# Patient Record
Sex: MALE | Race: OTHER
[De-identification: names, ages, dates, MRNs, and addresses within clinical notes are randomized per-mention and may not be internally consistent; named-entity substitution may affect disease eponyms.]

---

## 2020-03-05 ENCOUNTER — HOSPITAL ENCOUNTER (EMERGENCY)
Dept: HOSPITAL 8 - ED | Age: 70
Discharge: HOME | End: 2020-03-05
Payer: COMMERCIAL

## 2020-03-05 VITALS — HEIGHT: 66 IN | BODY MASS INDEX: 39.26 KG/M2 | WEIGHT: 244.27 LBS

## 2020-03-05 VITALS — SYSTOLIC BLOOD PRESSURE: 134 MMHG | DIASTOLIC BLOOD PRESSURE: 74 MMHG
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
## 2020-03-05 DIAGNOSIS — E11.9: ICD-10-CM

## 2020-03-05 DIAGNOSIS — M54.12: ICD-10-CM

## 2020-03-05 DIAGNOSIS — J02.9: ICD-10-CM
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
## 2020-03-05 DIAGNOSIS — S16.1XXA: Primary | ICD-10-CM
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
## 2020-03-05 LAB
ANION GAP SERPL CALC-SCNC: 7 MMOL/L (ref 5–15)
BASOPHILS # BLD AUTO: 0.03 X10^3/UL (ref 0–0.1)
BASOPHILS NFR BLD AUTO: 0 % (ref 0–1)
CALCIUM SERPL-MCNC: 8.4 MG/DL (ref 8.5–10.1)
CHLORIDE SERPL-SCNC: 108 MMOL/L (ref 98–107)
CREAT SERPL-MCNC: 0.78 MG/DL (ref 0.7–1.3)
EOSINOPHIL # BLD AUTO: 0.09 X10^3/UL (ref 0–0.4)
EOSINOPHIL NFR BLD AUTO: 1 % (ref 1–7)
ERYTHROCYTE [DISTWIDTH] IN BLOOD BY AUTOMATED COUNT: 14.4 % (ref 9.4–14.8)
LYMPHOCYTES # BLD AUTO: 2.11 X10^3/UL (ref 1–3.4)
LYMPHOCYTES NFR BLD AUTO: 30 % (ref 22–44)
MCH RBC QN AUTO: 32.2 PG (ref 27.5–34.5)
MCHC RBC AUTO-ENTMCNC: 33.7 G/DL (ref 33.2–36.2)
MCV RBC AUTO: 95.6 FL (ref 81–97)
MD: NO
MONOCYTES # BLD AUTO: 0.5 X10^3/UL (ref 0.2–0.8)
MONOCYTES NFR BLD AUTO: 7 % (ref 2–9)
NEUTROPHILS # BLD AUTO: 4.28 X10^3/UL (ref 1.8–6.8)
NEUTROPHILS NFR BLD AUTO: 61 % (ref 42–75)
PLATELET # BLD AUTO: 254 X10^3/UL (ref 130–400)
PMV BLD AUTO: 8 FL (ref 7.4–10.4)
RBC # BLD AUTO: 4.27 X10^6/UL (ref 4.38–5.82)

## 2020-03-05 PROCEDURE — 70491 CT SOFT TISSUE NECK W/DYE: CPT

## 2020-03-05 PROCEDURE — 87880 STREP A ASSAY W/OPTIC: CPT

## 2020-03-05 PROCEDURE — 85025 COMPLETE CBC W/AUTO DIFF WBC: CPT

## 2020-03-05 PROCEDURE — 80048 BASIC METABOLIC PNL TOTAL CA: CPT

## 2020-03-05 PROCEDURE — 36415 COLL VENOUS BLD VENIPUNCTURE: CPT

## 2020-03-05 PROCEDURE — 87081 CULTURE SCREEN ONLY: CPT

## 2020-03-05 PROCEDURE — 99285 EMERGENCY DEPT VISIT HI MDM: CPT

## 2020-03-05 NOTE — NUR
PT CAME IN CO OF "SCRATCHY SORE THROAT". DENIES FEVER, CHILLS. DENIES HAVING 
TROUBLE SWALLOING. STARTED 2 WEEKS AGO

## 2022-01-24 ENCOUNTER — OCCUPATIONAL MEDICINE (OUTPATIENT)
Dept: URGENT CARE | Facility: CLINIC | Age: 72
End: 2022-01-24
Payer: COMMERCIAL

## 2022-01-24 ENCOUNTER — APPOINTMENT (OUTPATIENT)
Dept: RADIOLOGY | Facility: IMAGING CENTER | Age: 72
End: 2022-01-24
Attending: FAMILY MEDICINE
Payer: COMMERCIAL

## 2022-01-24 VITALS
TEMPERATURE: 97.8 F | SYSTOLIC BLOOD PRESSURE: 142 MMHG | HEART RATE: 83 BPM | WEIGHT: 249.4 LBS | DIASTOLIC BLOOD PRESSURE: 82 MMHG | BODY MASS INDEX: 39.15 KG/M2 | HEIGHT: 67 IN | RESPIRATION RATE: 24 BRPM | OXYGEN SATURATION: 92 %

## 2022-01-24 DIAGNOSIS — S89.92XA INJURY OF LEFT SHIN, INITIAL ENCOUNTER: ICD-10-CM

## 2022-01-24 DIAGNOSIS — S89.91XA INJURY OF RIGHT SHIN, INITIAL ENCOUNTER: ICD-10-CM

## 2022-01-24 DIAGNOSIS — M79.662 PAIN IN LEFT SHIN: ICD-10-CM

## 2022-01-24 LAB
AMP AMPHETAMINE: NORMAL
BREATH ALCOHOL COMMENT: NORMAL
COC COCAINE: NORMAL
INT CON NEG: NEGATIVE
INT CON POS: POSITIVE
MET METHAMPHETAMINES: NORMAL
OPI OPIATES: NORMAL
PCP PHENCYCLIDINE: NORMAL
POC BREATHALIZER: 0 PERCENT (ref 0–0.01)
POC DRUG COMMENT 753798-OCCUPATIONAL HEALTH: NEGATIVE
THC: NORMAL

## 2022-01-24 PROCEDURE — 80305 DRUG TEST PRSMV DIR OPT OBS: CPT | Performed by: FAMILY MEDICINE

## 2022-01-24 PROCEDURE — 99203 OFFICE O/P NEW LOW 30 MIN: CPT | Performed by: FAMILY MEDICINE

## 2022-01-24 PROCEDURE — 82075 ASSAY OF BREATH ETHANOL: CPT | Performed by: FAMILY MEDICINE

## 2022-01-24 PROCEDURE — 73590 X-RAY EXAM OF LOWER LEG: CPT | Mod: TC,LT | Performed by: FAMILY MEDICINE

## 2022-01-24 RX ORDER — GABAPENTIN 100 MG/1
100 CAPSULE ORAL 3 TIMES DAILY
COMMUNITY

## 2022-01-24 ASSESSMENT — ENCOUNTER SYMPTOMS: FEVER: 0

## 2022-01-24 NOTE — LETTER
"EMPLOYEE’S CLAIM FOR COMPENSATION/ REPORT OF INITIAL TREATMENT  FORM C-4    EMPLOYEE’S CLAIM - PROVIDE ALL INFORMATION REQUESTED   First Name  Tom Last Name  Godfrey Birthdate                    1950                Sex  male Claim Number (Insurer’s Use Only)    Home Address  [address] Age  71 y.o. Height  1.69 m (5' 6.54\") Weight  113 kg (249 lb 6.4 oz) [facility]     [facility] Zip  [address] Telephone  [phone] (home)    Mailing Address  [address] [facility] Zip  [address] Primary Language Spoken  Croatian    Insurer   Third-Party   [employer]   Employee's Occupation (Job Title) When Injury or Occupational Disease Occurred      Employer's Name/Company Name  [employer]  Telephone  [phone]    Office Mail Address (Number and Street)   [address]  [facility]  Zip  [address]    Date of Injury  1/19/2022               Hours Injury  4:30 PM Date Employer Notified  1/19/2022 Last Day of Work after Injury     or Occupational Disease  1/22/2022 Supervisor to Whom Injury     Reported  Sr Ivonne   Address or Location of Accident (if applicable)  [[employer]]   What were you doing at the time of accident? (if applicable)  [employer]    How did this injury or occupational disease occur? (Be specific an answer in detail. Use additional sheet if necessary)  Moviendo cammie raca de vatas  call nieca sov de jassi   If you believe that you have an occupational disease, when did you first have knowledge of the disability and it relationship to your employment?  na Witnesses to the Accident  Ivonne      Nature of Injury or Occupational Disease  Sprain  Part(s) of Body Injured or Affected  Lower Leg (L), Lower Arm (R),     I certify that the above is true and correct to the best of my knowledge and that I have provided this information in order to obtain the benefits of [place]’s " [place]  I hereby authorize any physician, chiropractor, surgeon, practitioner, or other person, any hospital, including [facility], any medical service organization, any insurance company, or other institution or organization to release to each other, any medical or other information, including benefits paid or payable, pertinent to this injury or disease, except information relative to diagnosis, treatment and/or counseling for AIDS, psychological conditions, alcohol or controlled substances, for which I must give specific authorization.  A Photostat of this authorization shall be as valid as the original.     Date   Place Employee’s Original or  *Electronic Signature   THIS REPORT MUST BE COMPLETED AND MAILED WITHIN 3 WORKING DAYS OF TREATMENT   Place  [facility]  Name of [facility]   Date  1/24/2022 Diagnosis and Description of Injury or Occupational Disease  (M79.662) Pain in left shin  (S89.92XA) Injury of left shin, initial encounter  (S89.91XA) Injury of right shin, initial encounter Is there evidence the injured employee was under the influence of alcohol and/or another controlled substance at the time of accident?  ? No ? Yes (if yes, please explain)    Hour  2:45 PM   Diagnoses of Pain in left shin, Injury of left shin, initial encounter, and Injury of right shin, initial encounter were pertinent to this visit. No   Treatment  NSAIDs, heat, activity modification  Released to full duty   Have you advised the patient to remain off work five days or     more?    X-Ray Findings  Negative   ? Yes Indicate dates:   From   To      From information given by the employee, together with medical evidence, can        you directly connect this injury or occupational disease as job incurred?  Yes ? No If no, is the injured employee capable of:  ? full duty  Yes ?  "modified duty      Is additional medical care by a physician indicated?  No If Modified Duty, Specify any Limitations / Restrictions      Do you know of any previous injury or disease contributing to this condition or occupational disease?  ? Yes ? No (Explain if yes)                          No   Date  1/24/2022 Print Health Care Provider's   Raz Delgado M.D. I certify the employer’s copy of  this form was mailed on:   Address  [address] [place] [address] [address] Insurer’s Use Only     [facility] Zip  [address]    Provider’s Tax ID Number  [id number] Telephone  Dept: [phone]             Health Care Provider’s Original or Electronic Signature  e-RAZ Mack M.D. Degree (MD,DO, DC,PA-C,APRN)   MD      * Complete and attach Release of Information (Form C-4A) when injured employee signs C-4 Form electronically  ORIGINAL - TREATING HEALTHCARE PROVIDER PAGE 2 - INSURER/TPA PAGE 3 - EMPLOYER PAGE 4 - EMPLOYEE             Form C-4 (rev.08/21)           BRIEF DESCRIPTION OF RIGHTS AND BENEFITS  (Pursuant to [place] 616[place])    Notice of Injury or Occupational Disease (Incident Report Form C-1): If an injury or occupational disease (OD) arises out of and in the course of employment, you must provide written notice to your employer as soon as practicable, but no later than 7 days after the accident or OD. Your employer shall maintain a sufficient supply of the required forms.    Claim for Compensation (Form C-4): If medical treatment is sought, the form C-4 is available at the place of initial treatment. A completed \"Claim for Compensation\" (Form C-4) must be filed within 90 days after an accident or OD. The treating physician or chiropractor must, within 3 working days after treatment, complete and mail to the employer, the employer's insurer and third-party , the Claim for Compensation.    Medical Treatment: If you require medical treatment for your on-the-job injury or OD, you may be " required to select a physician or chiropractor from a list provided by your workers’ compensation insurer, if it has contracted with an Organization for Managed Care (MCO) or Preferred Provider Organization (PPO) or providers of health care. If your employer has not entered into a contract with an MCO or PPO, you may select a physician or chiropractor from the Panel of Physicians and Chiropractors. Any medical costs related to your industrial injury or OD will be paid by your insurer.    Temporary Total Disability (TTD): If your doctor has certified that you are unable to work for a period of at least 5 consecutive days, or 5 cumulative days in a 20-day period, or places restrictions on you that your employer does not accommodate, you may be entitled to TTD compensation.    Temporary Partial Disability (TPD): If the wage you receive upon reemployment is less than the compensation for TTD to which you are entitled, the insurer may be required to pay you TPD compensation to make up the difference. TPD can only be paid for a maximum of 24 months.    Permanent Partial Disability (PPD): When your medical condition is stable and there is an indication of a PPD as a result of your injury or OD, within 30 days, your insurer must arrange for an evaluation by a rating physician or chiropractor to determine the degree of your PPD. The amount of your PPD award depends on the date of injury, the results of the PPD evaluation, your age and wage.    Permanent Total Disability (PTD): If you are medically certified by a treating physician or chiropractor as permanently and totally disabled and have been granted a PTD status by your insurer, you are entitled to receive monthly benefits not to exceed 66 2/3% of your average monthly wage. The amount of your PTD payments is subject to reduction if you previously received a lump-sum PPD award.    Vocational Rehabilitation Services: You may be eligible for vocational rehabilitation  services if you are unable to return to the job due to a permanent physical impairment or permanent restrictions as a result of your injury or occupational disease.    Transportation and Per William Reimbursement: You may be eligible for travel expenses and per william associated with medical treatment.    Reopening: You may be able to reopen your claim if your condition worsens after claim closure.     Appeal Process: If you disagree with a written determination issued by the insurer or the insurer does not respond to your request, you may appeal to the Department of Administration, , by following the instructions contained in your determination letter. You must appeal the determination within 70 days from the date of the determination letter at [address], [address], [place], [place] [address], or [address], [place], [place] [address]. If you disagree with the  decision, you may appeal to the Department of Administration, . You must file your appeal within 30 days from the date of the  decision letter at [address], [address], [place], [place] [address], or [address] [facility] [address] [facility] [address], [place] [address]. If you disagree with a decision of an , you may file a petition for judicial review with the District Court. You must do so within 30 days of the Appeal Officer’s decision. You may be represented by an  at your own expense or you may contact the [facility] for possible representation.    [place]  for Injured Workers (NAIW): If you disagree with a  decision, you may request that NAIW represent you without charge at an  Hearing. For information regarding denial of benefits, you may contact the [facility] at: [address], [address], [place] [address], [phone], or [address] [facility] [address] [facility] [address], [place] [address], [phone]    To File a  Complaint with the Division: If you wish to file a complaint with the  of the Division of Industrial Relations (DIR),  please contact the Workers’ Compensation Section, [address], [address], [place], [place] [address], telephone [phone], or [address], [address], [place], [place] [address], telephone [phone].    For assistance with Workers’ Compensation Issues: You may contact the [facility] Office for Consumer Health Assistance, [address], [address], [place] [address], Toll Free [phone], Web site: http://[facility].[place].gov/Programs/SILVIA E-mail: [email or web address].[place].gov              __________________________________________________________________                                    _________________            Employee Name / Signature                                                                                                                            Date                                                                                                                                                                                                                              D-2 (rev. 10/20)

## 2022-01-24 NOTE — PROGRESS NOTES
"Subjective:     Tom Donahue is a 71 y.o. male who presents for Leg Pain ( NEW DOI: 1/19/2022 Bilateral pain.  Right leg has a lesion due to a rock hitting the chin in the laundry room, left leg swollen and painful to touch.)    HPI  Pt presents for evaluation of an acute problem  DOI: 1/19/22   JYOTSNA: Bilateral leg pain after being struck by a rack while at work   Rack fell over and pt tripped over it   Having pain in the shin at the point of impact   Has bruising on left shin, has a scabbed lesion on left shin   Area is very tender to touch   Able to ambulate without limp     Review of Systems   Constitutional: Negative for fever.     PMH: Past medical history reviewed in Epic  MEDS: Medications were reviewed in Epic  ALLERGIES: Allergies were reviewed in Epic     Objective:   /82 (BP Location: Right arm, Patient Position: Sitting, BP Cuff Size: Adult)   Pulse 83   Temp 36.6 °C (97.8 °F) (Temporal)   Resp (!) 24   Ht 1.69 m (5' 6.54\")   Wt 113 kg (249 lb 6.4 oz)   SpO2 92%   BMI 39.61 kg/m²     Physical Exam  Constitutional:       General: He is not in acute distress.     Appearance: He is well-developed. He is not diaphoretic.   Pulmonary:      Effort: Pulmonary effort is normal.   Neurological:      Mental Status: He is alert.     Bilateral lower extremities:  Appearance: No bruising, erythema, or deformity appreciated  Palpation: +TTP lower aspect of left anterior shin and no along the lateral distal leg approximately 10 cm above lateral malleolus  ROM: FROM throughout  Strength: 5/5 throughout  Special testing: Neg squeeze test  Neurovascular: 2+ dorsalis pedis and posterior tibial.  Sensation intact and equal bilaterally  Gait: Nonantalgic, neutral stance without pes planus    Assessment/Plan:   Assessment    1. Pain in left shin  - DX-TIBIA AND FIBULA LEFT; Future  - POCT Breath Alcohol Test  - POCT 6 Panel Urine Drug Screen    2. Injury of left shin, initial encounter  - DX-TIBIA AND FIBULA " LEFT; Future  - POCT Breath Alcohol Test  - POCT 6 Panel Urine Drug Screen    3. Injury of right shin, initial encounter  - POCT Breath Alcohol Test  - POCT 6 Panel Urine Drug Screen    Other orders  - gabapentin (NEURONTIN) 100 MG Cap; Take 100 mg by mouth 3 times a day.    With contusions of bilateral shins after an injury at work.  X-ray of the left does not show any fracture.  Reviewed supportive care measures and expected course of recovery.  We will follow-up on an as-needed basis.  C4 and D 39 given.

## 2022-01-24 NOTE — LETTER
[address] [place] [address]  Phone:  [phone] - Fax:  [phone]   [facility] Progress Report and Disability Certification  Date of Service: 1/24/2022   No Show:  No  Date / Time of Next Visit:    Claim Information   Patient Name: Tom Donahue  Claim Number:     Employer: [employer]  Date of Injury: 1/19/2022     Insurer / TPA: [employer]  ID / SSN:     Occupation:   Diagnosis: Diagnoses of Pain in left shin, Injury of left shin, initial encounter, and Injury of right shin, initial encounter were pertinent to this visit.    Medical Information   Related to Industrial Injury? Yes    Subjective Complaints:  Pt presents for evaluation of an acute problem  DOI: 1/19/22   JYOTSNA: Bilateral leg pain after being struck by a rack while at work   Rack fell over and pt tripped over it   Having pain in the shin at the point of impact   Has bruising on left shin, has a scabbed lesion on left shin   Area is very tender to touch   Able to ambulate without limp    Objective Findings: Bilateral lower extremities:  Appearance: No bruising, erythema, or deformity appreciated  Palpation: +TTP lower aspect of left anterior shin and no along the lateral distal leg approximately 10 cm above lateral malleolus  ROM: FROM throughout  Strength: 5/5 throughout  Special testing: Neg squeeze test  Neurovascular: 2+ dorsalis pedis and posterior tibial.  Sensation intact and equal bilaterally  Gait: Nonantalgic, neutral stance without pes planus   Pre-Existing Condition(s):     Assessment:   Initial Visit    Status: Discharged /  MMI  Permanent Disability:No    Plan:      Diagnostics: X-ray  Comments:No fracture     Comments:       Disability Information   Status: Released to Full Duty    From:  1/24/2022  Through: 1/24/2022 Restrictions are:     Physical Restrictions   Sitting:    Standing:    Stooping:    Bending:      Squatting:    Walking:     Climbing:    Pushing:      Pulling:    Other:    Reaching Above Shoulder (L):   Reaching Above Shoulder (R):       Reaching Below Shoulder (L):    Reaching Below Shoulder (R):      Not to exceed Weight Limits   Carrying(hrs):   Weight Limit(lb):   Lifting(hrs):   Weight  Limit(lb):     Comments: Released as MMI     Repetitive Actions   Hands: i.e. Fine Manipulations from Grasping:     Feet: i.e. Operating Foot Controls:     Driving / Operate Machinery:     Health Care Provider’s Original or Electronic Signature  Red Delgado M.D. Health Care Provider’s Original or Electronic Signature    Ruperto Barba MD         Clinic Name / Location: [address] [address] Clinic Phone Number: Dept: [phone]   Appointment Time: 1:45 Pm Visit Start Time: 2:45 PM   Check-In Time:  2:25 Pm Visit Discharge Time:  4:26 PM    Original-Treating Physician or Chiropractor    Page 2-Insurer/TPA    Page 3-Employer    Page 4-Employee